# Patient Record
Sex: FEMALE
[De-identification: names, ages, dates, MRNs, and addresses within clinical notes are randomized per-mention and may not be internally consistent; named-entity substitution may affect disease eponyms.]

---

## 2019-01-03 ENCOUNTER — HOSPITAL ENCOUNTER (EMERGENCY)
Dept: HOSPITAL 14 - H.ER | Age: 34
Discharge: HOME | End: 2019-01-03
Payer: SELF-PAY

## 2019-01-03 VITALS
TEMPERATURE: 98.9 F | HEART RATE: 91 BPM | SYSTOLIC BLOOD PRESSURE: 135 MMHG | OXYGEN SATURATION: 100 % | RESPIRATION RATE: 16 BRPM | DIASTOLIC BLOOD PRESSURE: 90 MMHG

## 2019-01-03 DIAGNOSIS — B34.9: ICD-10-CM

## 2019-01-03 DIAGNOSIS — G43.909: Primary | ICD-10-CM

## 2019-01-03 PROCEDURE — 96374 THER/PROPH/DIAG INJ IV PUSH: CPT

## 2019-01-03 PROCEDURE — 96361 HYDRATE IV INFUSION ADD-ON: CPT

## 2019-01-03 PROCEDURE — 87430 STREP A AG IA: CPT

## 2019-01-03 PROCEDURE — 87804 INFLUENZA ASSAY W/OPTIC: CPT

## 2019-01-03 PROCEDURE — 87070 CULTURE OTHR SPECIMN AEROBIC: CPT

## 2019-01-03 PROCEDURE — 96375 TX/PRO/DX INJ NEW DRUG ADDON: CPT

## 2019-01-03 PROCEDURE — 99283 EMERGENCY DEPT VISIT LOW MDM: CPT

## 2019-01-03 NOTE — ED PDOC
HPI:  Headache


Time Seen by Provider: 01/03/19 19:18


Chief Complaint (Nursing): Headache


Chief Complaint (Provider): Headache


History Per: Patient


History/Exam Limitations: no limitations


Onset/Duration Of Symptoms: Hrs


Current Symptoms Are (Timing): Still Present


Pain Scale Rating Of: 7


Additional Complaint(s): 


Patient is a 32 y/o female with a PMHx of asthma, seasonal allergies, and 

migraines who presents to the ED for evaluation of persistent headaches, ongoing

since last night. The patient reports that she went to sleep but woke up with 

the headache in the morning that persisted all day. Patient took Tylenol at 

15:00 today, however, that provided minimal relief. Patient reports the quality 

of her headache to be a 7/10. In addition, to the headache the patient has been 

experiencing generalized malaise, body aches, a fever, chills, and nausea. The 

patient denies vomiting, diarrhea, palpitations, or SOB.





PCP: Dr. Corin Cornejo





Past Medical History


Reviewed: Historical Data, Nursing Documentation, Vital Signs


Vital Signs: 





                                Last Vital Signs











Temp  98.9 F   01/03/19 18:59


 


Pulse  91 H  01/03/19 18:59


 


Resp  16   01/03/19 18:59


 


BP  135/90   01/03/19 18:59


 


Pulse Ox  100   01/03/19 18:59














- Medical History


PMH: Asthma





- Surgical History


Surgical History: No Surg Hx





- Family History


Family History: States: Unknown Family Hx





- Immunization History


Hx Influenza Vaccination: Yes





- Home Medications


Home Medications: 


                                Ambulatory Orders











 Medication  Instructions  Recorded


 


Acetaminophen/Butalbital/Caf 1 tab PO Q4H PRN 7 Days  tab 01/03/19





[Fioricet]  


 


Ibuprofen [Motrin] 600 mg PO Q6 PRN 7 Days  tab 01/03/19














- Allergies


Allergies/Adverse Reactions: 


                                    Allergies











Allergy/AdvReac Type Severity Reaction Status Date / Time


 


No Known Allergies Allergy   Verified 01/03/19 18:59














Review of Systems


ROS Statement: Except As Marked, All Systems Reviewed And Found Negative


Constitutional: Positive for: Fever (Subjective), Chills, Malaise, Other (Body 

Aches, Feels Ill)


Cardiovascular: Negative for: Palpitations


Respiratory: Negative for: Shortness of Breath


Gastrointestinal: Positive for: Nausea


Neurological: Positive for: Headache.  Negative for: Dizziness





Physical Exam





- Reviewed


Nursing Documentation Reviewed: Yes


Vital Signs Reviewed: Yes





- Physical Exam


Appears: Positive for: Uncomfortable


Head Exam: Positive for: ATRAUMATIC, NORMAL INSPECTION, NORMOCEPHALIC


Skin: Positive for: Normal Color


Eye Exam: Positive for: Normal appearance, EOMI


ENT: Positive for: Pharyngeal Erythema (Mild)


Neck: Positive for: Normal (Full ROM with flexion and extension laterally)


Cardiovascular/Chest: Positive for: Regular Rate, Rhythm


Respiratory: Positive for: Normal Breath Sounds


Gastrointestinal/Abdominal: Positive for: Normal Exam


Extremity: Positive for: Normal ROM, Other (Normal  Strength in Upper, 

Bilateral Extremity)


Neurologic/Psych: Positive for: Alert, Oriented.  Negative for: Facial Droop 

(Cheeks Puffy)





- ECG


O2 Sat by Pulse Oximetry: 100 (RA)


Pulse Ox Interpretation: Normal





Medical Decision Making


Medical Decision Making: 


Time: 1930


Plan:


NS 1L IV 


Reglan 10 mg IVP


Toradol 30 mg IVP


Influenza A B


Rapid Strep Group A Antigen





 

--------------------------------------------------------------------------------


-----------------


Scribe Attestation:


Documented by Ty Ricks, acting as a scribe for Eliza LIANG.


Provider Scribe Attestation:


All medical record entries made by the Scribe were at my direction and 

personally dictated by me. I have reviewed the chart and agree that the record 

accurately reflects my personal performance of the history, physical exam, 

medical decision making, and the department course for this patient. I have also

 personally directed, reviewed, and agree with the discharge instructions and 

disposition.





21:30: re-assessed, pain has improved and overall feeling better. Patient 

informed that test results negative for Influenza or Strep. Likely viral 

illness. Return instructions given.  





Disposition





- Clinical Impression


Clinical Impression: 


 Migraine, Viral illness








- Patient ED Disposition


Is Patient to be Admitted: No


Counseled Patient/Family Regarding: Studies Performed, Diagnosis, Need For 

Followup, Rx Given





- Disposition


Referrals: 


[facility] [Outside]


Disposition: Routine/Home


Disposition Time: 22:00


Condition: STABLE


Additional Instructions: 


F/u with your primary care doctor for further treatment of migraines. Stay 

hydrated. Take Ibuprofen and Tylenol for body aches. Take Fioricet for 

migraines. Return to ER if you are unable to tolerate fluids or have visual 

changes, weakness.


Prescriptions: 


Acetaminophen/Butalbital/Caf [Fioricet] 1 tab PO Q4H PRN 7 Days  tab


 PRN Reason: Headache


Ibuprofen [Motrin] 600 mg PO Q6 PRN 7 Days  tab


 PRN Reason: Pain, Moderate (4-7)


Instructions:  Migraine Headache (DC)


Forms:  [employer] (Malagasy)


Print Language: Nepali